# Patient Record
Sex: FEMALE | Race: BLACK OR AFRICAN AMERICAN | Employment: UNEMPLOYED
[De-identification: names, ages, dates, MRNs, and addresses within clinical notes are randomized per-mention and may not be internally consistent; named-entity substitution may affect disease eponyms.]

---

## 2017-02-16 ENCOUNTER — OFFICE VISIT (OUTPATIENT)
Dept: OBGYN | Facility: CLINIC | Age: 42
End: 2017-02-16

## 2017-02-16 VITALS
DIASTOLIC BLOOD PRESSURE: 80 MMHG | WEIGHT: 183 LBS | HEIGHT: 65 IN | SYSTOLIC BLOOD PRESSURE: 110 MMHG | BODY MASS INDEX: 30.49 KG/M2

## 2017-02-16 DIAGNOSIS — D25.9 UTERINE LEIOMYOMA, UNSPECIFIED LOCATION: ICD-10-CM

## 2017-02-16 DIAGNOSIS — N92.0 MENORRHAGIA WITH REGULAR CYCLE: Primary | ICD-10-CM

## 2017-02-16 DIAGNOSIS — N94.6 DYSMENORRHEA: ICD-10-CM

## 2017-02-16 PROCEDURE — G8484 FLU IMMUNIZE NO ADMIN: HCPCS | Performed by: OBSTETRICS & GYNECOLOGY

## 2017-02-16 PROCEDURE — G8417 CALC BMI ABV UP PARAM F/U: HCPCS | Performed by: OBSTETRICS & GYNECOLOGY

## 2017-02-16 PROCEDURE — 99213 OFFICE O/P EST LOW 20 MIN: CPT | Performed by: OBSTETRICS & GYNECOLOGY

## 2017-02-16 PROCEDURE — 1036F TOBACCO NON-USER: CPT | Performed by: OBSTETRICS & GYNECOLOGY

## 2017-02-16 PROCEDURE — G8427 DOCREV CUR MEDS BY ELIG CLIN: HCPCS | Performed by: OBSTETRICS & GYNECOLOGY

## 2017-10-19 ENCOUNTER — OFFICE VISIT (OUTPATIENT)
Dept: FAMILY MEDICINE CLINIC | Age: 42
End: 2017-10-19
Payer: COMMERCIAL

## 2017-10-19 VITALS
SYSTOLIC BLOOD PRESSURE: 108 MMHG | TEMPERATURE: 98.1 F | RESPIRATION RATE: 16 BRPM | HEIGHT: 64 IN | HEART RATE: 65 BPM | BODY MASS INDEX: 30.01 KG/M2 | DIASTOLIC BLOOD PRESSURE: 69 MMHG | WEIGHT: 175.8 LBS

## 2017-10-19 DIAGNOSIS — Z13.220 LIPID SCREENING: ICD-10-CM

## 2017-10-19 DIAGNOSIS — Z23 NEED FOR TETANUS BOOSTER: ICD-10-CM

## 2017-10-19 DIAGNOSIS — Z94.0 KIDNEY TRANSPLANT RECIPIENT: ICD-10-CM

## 2017-10-19 DIAGNOSIS — J45.20 MILD INTERMITTENT ASTHMA WITHOUT COMPLICATION: ICD-10-CM

## 2017-10-19 DIAGNOSIS — Z83.3 FAMILY HISTORY OF DIABETES MELLITUS (DM): ICD-10-CM

## 2017-10-19 DIAGNOSIS — Z23 NEED FOR VACCINATION WITH 13-POLYVALENT PNEUMOCOCCAL CONJUGATE VACCINE: ICD-10-CM

## 2017-10-19 DIAGNOSIS — I10 ESSENTIAL HYPERTENSION: Primary | ICD-10-CM

## 2017-10-19 DIAGNOSIS — Z11.4 SCREENING FOR HIV WITHOUT PRESENCE OF RISK FACTORS: ICD-10-CM

## 2017-10-19 DIAGNOSIS — Z23 NEED FOR INFLUENZA VACCINATION: ICD-10-CM

## 2017-10-19 PROCEDURE — G0009 ADMIN PNEUMOCOCCAL VACCINE: HCPCS | Performed by: INTERNAL MEDICINE

## 2017-10-19 PROCEDURE — 99204 OFFICE O/P NEW MOD 45 MIN: CPT | Performed by: INTERNAL MEDICINE

## 2017-10-19 PROCEDURE — G8419 CALC BMI OUT NRM PARAM NOF/U: HCPCS | Performed by: INTERNAL MEDICINE

## 2017-10-19 PROCEDURE — G0008 ADMIN INFLUENZA VIRUS VAC: HCPCS | Performed by: INTERNAL MEDICINE

## 2017-10-19 PROCEDURE — 90715 TDAP VACCINE 7 YRS/> IM: CPT | Performed by: INTERNAL MEDICINE

## 2017-10-19 PROCEDURE — 90686 IIV4 VACC NO PRSV 0.5 ML IM: CPT | Performed by: INTERNAL MEDICINE

## 2017-10-19 PROCEDURE — 90670 PCV13 VACCINE IM: CPT | Performed by: INTERNAL MEDICINE

## 2017-10-19 PROCEDURE — G8484 FLU IMMUNIZE NO ADMIN: HCPCS | Performed by: INTERNAL MEDICINE

## 2017-10-19 PROCEDURE — 1036F TOBACCO NON-USER: CPT | Performed by: INTERNAL MEDICINE

## 2017-10-19 PROCEDURE — G8427 DOCREV CUR MEDS BY ELIG CLIN: HCPCS | Performed by: INTERNAL MEDICINE

## 2017-10-19 RX ORDER — METOPROLOL TARTRATE AND HYDROCHLOROTHIAZIDE 50; 25 MG/1; MG/1
1 TABLET ORAL DAILY
Qty: 30 TABLET | Refills: 2 | Status: SHIPPED | OUTPATIENT
Start: 2017-10-19 | End: 2018-01-22 | Stop reason: SDUPTHER

## 2017-10-19 RX ORDER — CLONIDINE HYDROCHLORIDE 0.2 MG/1
0.2 TABLET ORAL 2 TIMES DAILY
Qty: 60 TABLET | Refills: 2 | Status: SHIPPED | OUTPATIENT
Start: 2017-10-19 | End: 2018-01-22 | Stop reason: SDUPTHER

## 2017-10-19 RX ORDER — ALBUTEROL SULFATE 90 UG/1
2 AEROSOL, METERED RESPIRATORY (INHALATION) EVERY 6 HOURS PRN
Qty: 1 INHALER | Refills: 2 | Status: SHIPPED | OUTPATIENT
Start: 2017-10-19 | End: 2018-01-25 | Stop reason: SDUPTHER

## 2017-10-19 ASSESSMENT — PATIENT HEALTH QUESTIONNAIRE - PHQ9
SUM OF ALL RESPONSES TO PHQ9 QUESTIONS 1 & 2: 0
1. LITTLE INTEREST OR PLEASURE IN DOING THINGS: 0
SUM OF ALL RESPONSES TO PHQ QUESTIONS 1-9: 0
2. FEELING DOWN, DEPRESSED OR HOPELESS: 0

## 2017-10-19 NOTE — PROGRESS NOTES
[facility] PHYSICIANS  [facility] AND [facility] REHABILITATION [address] [facility] [address] [place] [address]  Dept: [phone]  Dept Fax: [phone]    New Patient Visit Note  Date of patient's visit: 10/19/2017  Patient's Name:  Cloria Koyanagi YOB: 1975            No care team member to display  ______________________________________________________________________    Reason for visit: Establish care/Preventative care  ______________________________________________________________________  Chief Compliant   Establish Care and Hypertension      ______________________________________________________________________  History of Presenting Illness:  History was obtained from the patient. Cloria Koyanagi is a 39 y.o. is here to establish care. Last visit with PCP was 3 months ago. Patient left PCP because he retired. Patient is a transplant recipient and currently on Prograf. She follows up with her nephrologist.  She has hypertension for which she is on medication for this. Blood pressures controlled. She also has history of asthma which is controlled with albuterol inhaler only. She doesn't have any new complaints today. She is due for blood work. She is also due for immunizations.   ______________________________________________________________________  Past Medical/Surgical History:        Diagnosis Date    Asthma     HTN (hypertension)     Hx of kidney transplant            Procedure Laterality Date    DIALYSIS FISTULA CREATION Right     no bp right arm x 2    KIDNEY TRANSPLANT  2006    TUBAL LIGATION         ______________________________________________________________________  Health Maintenance Due   Topic Date Due    HIV screen  12/08/1990    DTaP/Tdap/Td vaccine (1 - Tdap) 12/08/1994    Pneumococcal highest risk (1 of 3 - PCV13) 12/08/1994    Lipid screen  12/08/2015    Diabetes screen  12/08/2015    Flu vaccine (1) 09/01/2017       Allergies   Allergen Reactions    Iodine Encounters:   10/19/17 108/69   02/16/17 110/80   11/01/16 140/84      General appearance - alert, well appearing, and in no distress  Mental status - alert, oriented to person, place, and time  Lymphatics - no palpable lymphadenopathy, no hepatosplenomegaly  Chest - clear to auscultation, no wheezes, rales or rhonchi, symmetric air entry  Heart - normal rate, regular rhythm, normal S1, S2, no murmurs, rubs, clicks or gallops  Abdomen - soft, nontender, nondistended, no masses or organomegaly  Neurological - alert, oriented, normal speech, no focal findings or movement disorder noted  Musculoskeletal - no joint tenderness, deformity or swelling  Extremities - peripheral pulses normal, no pedal edema, no clubbing or cyanosis    ______________________________________________________________________  Diagnostic findings:  CBC:No results found for: WBC, HGB, PLT    BMP:  No results found for: NA, K, CL, CO2, BUN, CREATININE, GLUCOSE    HEMOGLOBIN A1C: No results found for: LABA1C    FASTING LIPID PANEL:No results found for: CHOL, HDL, TRIG  ______________________________________________________________________  Assessment and Plan:  Maximino Favre was seen today for establish care and hypertension. Diagnoses and all orders for this visit:    Essential hypertension  -     Basic Metabolic Panel; Future  -     CBC Auto Differential; Future  -     metoprolol-hydrochlorothiazide (LOPRESSOR HCT) 50-25 MG per tablet; Take 1 tablet by mouth daily  -     cloNIDine (CATAPRES) 0.2 MG tablet;  Take 1 tablet by mouth 2 times daily    Need for influenza vaccination  -     INFLUENZA, QUADV, 3 YRS AND OLDER, IM, PF, PREFILL SYR OR SDV, 0.5ML (FLUZONE QUADV, PF)    Need for vaccination with 13-polyvalent pneumococcal conjugate vaccine  -     Pneumococcal conjugate vaccine 13-valent    Need for tetanus booster  -     Tdap (age 10y-63y) IM (Adacel)    Mild intermittent asthma without complication  -     albuterol sulfate HFA (PROAIR HFA) 108 (90 Base) MCG/ACT inhaler; Inhale 2 puffs into the lungs every 6 hours as needed for Wheezing    Kidney transplant recipient  -     Basic Metabolic Panel; Future    Lipid screening  -     Lipid Panel; Future    Family history of diabetes mellitus (DM)  -     Hemoglobin A1C; Future    Screening for HIV without presence of risk factors  -     HIV Screen; Future        ______________________________________________________________________  Follow up and instructions:  · Return in about 3 months (around 1/19/2018) for HTN. · Virl Officer received counseling on the following healthy behaviors: nutrition and exercise    · Discussed use, benefit, and side effects of prescribed medications. Barriers to medication compliance addressed. All patient questions answered. Pt voiced understanding. · Patient given educational materials - see patient instructions    Cherry Cordero MD, [place], [address] [place] [address] Internal Medicine Associate  10/19/2017, 10:50 AM    This note is created with the assistance of a speech-recognition program. While intending to generate a document that actually reflects the content of the visit, the document can still have some mistakes which may not have been identified and corrected by editing.

## 2017-10-19 NOTE — PROGRESS NOTES
Chronic Disease Visit Information    BP Readings from Last 3 Encounters:   02/16/17 110/80   11/01/16 140/84          No results found for: LABA1C, LABMICR, LDLCHOLESTEROL, LDLCALC, HDL, BUN, CREATININE, GLUCOSE         Have you changed or started any medications since your last visit including any over-the-counter medicines, vitamins, or herbal medicines? no   Are you having any side effects from any of your medications? -  no  Have you stopped taking any of your medications? Is so, why? -  no    Have you seen any other physician or provider since your last visit? No  Have you had any other diagnostic tests since your last visit? No  Have you been seen in the emergency room and/or had an admission to a hospital since we last saw you? No  Have you had your annual diabetic retinal (eye) exam? No  Have you had your routine dental cleaning in the past 6 months? no    Have you activated your [employer] account? If not, what are your barriers?  No:      No care team member to display         Medical History Review  Past Medical, Family, and Social History reviewed and does contribute to the patient presenting condition    Health Maintenance   Topic Date Due    HIV screen  12/08/1990    DTaP/Tdap/Td vaccine (1 - Tdap) 12/08/1994    Pneumococcal highest risk (1 of 3 - PCV13) 12/08/1994    Lipid screen  12/08/2015    Diabetes screen  12/08/2015    Flu vaccine (1) 09/01/2017    Cervical cancer screen  11/01/2019

## 2017-12-07 ENCOUNTER — NURSE ONLY (OUTPATIENT)
Dept: FAMILY MEDICINE CLINIC | Age: 42
End: 2017-12-07
Payer: COMMERCIAL

## 2017-12-07 VITALS
BODY MASS INDEX: 30 KG/M2 | DIASTOLIC BLOOD PRESSURE: 75 MMHG | SYSTOLIC BLOOD PRESSURE: 120 MMHG | TEMPERATURE: 97.9 F | HEART RATE: 60 BPM | HEIGHT: 64 IN | WEIGHT: 175.71 LBS | RESPIRATION RATE: 16 BRPM

## 2017-12-07 DIAGNOSIS — Z23 NEED FOR MMR VACCINE: Primary | ICD-10-CM

## 2017-12-07 DIAGNOSIS — Z00.00 NORMAL PHYSICAL EXAM: ICD-10-CM

## 2017-12-07 PROCEDURE — 90707 MMR VACCINE SC: CPT | Performed by: INTERNAL MEDICINE

## 2017-12-07 PROCEDURE — 90471 IMMUNIZATION ADMIN: CPT | Performed by: INTERNAL MEDICINE

## 2017-12-07 PROCEDURE — 99396 PREV VISIT EST AGE 40-64: CPT | Performed by: INTERNAL MEDICINE

## 2017-12-07 ASSESSMENT — ENCOUNTER SYMPTOMS
DOUBLE VISION: 0
BLURRED VISION: 0
HEARTBURN: 0
NAUSEA: 0
COUGH: 0
HEMOPTYSIS: 0
ABDOMINAL PAIN: 0

## 2017-12-07 NOTE — PROGRESS NOTES
[facility] PHYSICIANS  [facility] [place] [facility] AND Radha Meehan [address] [place] [address]  Dept: [phone]  Dept Fax: [phone]    Office Progress/Follow Up Note  Date of patient's visit: 12/7/2017  Patient's Name:  Thai Villafana YOB: 1975            Patient Care Team:  Cherry Curry MD as PCP - General (Internal Medicine)  ================================================================    REASON FOR VISIT/CHIEF COMPLAINT:  Immunizations (MMR) and Annual Exam    HISTORY OF PRESENTING ILLNESS:  History was obtained from: patientKimberly Villafana is a 39 y.o. is here for annual physical exam and immunization completion. Overall patient is doing well. She recently got a job and would like to have physical as well as immunization completed. She doesn't have any new complaints today. Blood pressure and asthma controlled. Patient is cyst kidney transplant recipient. She is due for blood work which was given to her at 3 months ago. Problem list, medications and blood work reviewed. Patient Active Problem List   Diagnosis    Essential hypertension    Mild intermittent asthma without complication    Kidney transplant recipient       Health Maintenance Due   Topic Date Due    HIV screen  12/08/1990    Lipid screen  12/08/2015    Diabetes screen  12/08/2015       Allergies   Allergen Reactions    Iodine Itching    Pcn [Penicillins] Itching         Current Outpatient Prescriptions   Medication Sig Dispense Refill    metoprolol-hydrochlorothiazide (LOPRESSOR HCT) 50-25 MG per tablet Take 1 tablet by mouth daily 30 tablet 2    cloNIDine (CATAPRES) 0.2 MG tablet Take 1 tablet by mouth 2 times daily 60 tablet 2    albuterol sulfate HFA (PROAIR HFA) 108 (90 Base) MCG/ACT inhaler Inhale 2 puffs into the lungs every 6 hours as needed for Wheezing 1 Inhaler 2    PROGRAF 1 MG capsule        No current facility-administered medications for this visit.         Social History Substance Use Topics    Smoking status: Never Smoker    Smokeless tobacco: Never Used    Alcohol use Yes      Comment: social       Family History   Problem Relation Age of Onset    Heart Disease Maternal Grandmother     Cancer Maternal Grandmother     Prostate Cancer Maternal Grandfather     Hypertension Father     Cancer Father      leukemia    Hypertension Mother         REVIEW OF SYSTEMS:  Review of Systems   Constitutional: Negative for chills and fever. HENT: Negative for congestion, ear discharge and nosebleeds. Eyes: Negative for blurred vision and double vision. Respiratory: Negative for cough and hemoptysis. Cardiovascular: Negative for chest pain and palpitations. Gastrointestinal: Negative for abdominal pain, heartburn and nausea. Genitourinary: Negative for dysuria and urgency. Musculoskeletal: Negative for myalgias and neck pain. Neurological: Negative for dizziness and headaches. Endo/Heme/Allergies: Does not bruise/bleed easily. Psychiatric/Behavioral: Negative for depression and suicidal ideas. PHYSICAL EXAM:  Vitals:    12/07/17 0937   BP: 120/75   Site: Left Arm   Position: Sitting   Cuff Size: Medium Adult   Pulse: 60   Resp: 16   Temp: 97.9 °F (36.6 °C)   TempSrc: Oral   Weight: 175 lb 11.3 oz (79.7 kg)   Height: 5' 4.49\" (1.638 m)     BP Readings from Last 3 Encounters:   12/07/17 120/75   10/19/17 108/69   02/16/17 110/80        Physical Exam   Constitutional: She is oriented to person, place, and time and well-developed, well-nourished, and in no distress. No distress. HENT:   Mouth/Throat: No oropharyngeal exudate. Neck: Normal range of motion. Neck supple. No thyromegaly present. Cardiovascular: Normal rate, regular rhythm, normal heart sounds and intact distal pulses. Pulmonary/Chest: Effort normal and breath sounds normal. No respiratory distress. She has no wheezes. Abdominal: Soft.  Bowel sounds are normal. She exhibits no distension and no mass. There is no tenderness. Musculoskeletal: Normal range of motion. She exhibits no edema or tenderness. Neurological: She is alert and oriented to person, place, and time. No cranial nerve deficit. Skin: Skin is warm. No rash noted. She is not diaphoretic. No erythema. Psychiatric: Mood, memory, affect and judgment normal.         DIAGNOSTIC FINDINGS:  CBC:No results found for: WBC, HGB, PLT    BMP:  No results found for: NA, K, CL, CO2, BUN, CREATININE, GLUCOSE    HEMOGLOBIN A1C: No results found for: LABA1C    FASTING LIPID PANEL:No results found for: CHOL, HDL, TRIG    ASSESSMENT AND PLAN:  Virl Officer was seen today for immunizations and annual exam.    Diagnoses and all orders for this visit:    Need for MMR vaccine. MMR vaccine subcutaneous    Normal physical exam.  No limitation on her new employment. FOLLOW UP AND INSTRUCTIONS:  No Follow-up on file. · Virl Officer received counseling on the following healthy behaviors: nutrition and exercise    · Discussed use, benefit, and side effects of prescribed medications. Barriers to medication compliance addressed. All patient questions answered. Pt voiced understanding. · Patient given educational materials - see patient instructions    Cherry Purcell MD, [place], [address] [place] [address] Internal Medicine Associate  12/7/2017, 10:00 AM    This note is created with the assistance of a speech-recognition program. While intending to generate a document that actually reflects the content of the visit, the document can still have some mistakes which may not have been identified and corrected by editing.

## 2018-01-22 DIAGNOSIS — I10 ESSENTIAL HYPERTENSION: ICD-10-CM

## 2018-01-22 RX ORDER — METOPROLOL TARTRATE AND HYDROCHLOROTHIAZIDE 50; 25 MG/1; MG/1
TABLET ORAL
Qty: 30 TABLET | Refills: 2 | Status: SHIPPED | OUTPATIENT
Start: 2018-01-22 | End: 2018-04-28 | Stop reason: SDUPTHER

## 2018-01-22 RX ORDER — CLONIDINE HYDROCHLORIDE 0.2 MG/1
TABLET ORAL
Qty: 60 TABLET | Refills: 2 | Status: SHIPPED | OUTPATIENT
Start: 2018-01-22 | End: 2018-04-28 | Stop reason: SDUPTHER

## 2018-01-25 ENCOUNTER — OFFICE VISIT (OUTPATIENT)
Dept: FAMILY MEDICINE CLINIC | Age: 43
End: 2018-01-25
Payer: COMMERCIAL

## 2018-01-25 VITALS
RESPIRATION RATE: 16 BRPM | BODY MASS INDEX: 30.19 KG/M2 | TEMPERATURE: 98 F | DIASTOLIC BLOOD PRESSURE: 70 MMHG | WEIGHT: 176.8 LBS | HEART RATE: 64 BPM | HEIGHT: 64 IN | SYSTOLIC BLOOD PRESSURE: 102 MMHG

## 2018-01-25 DIAGNOSIS — Z13.220 LIPID SCREENING: ICD-10-CM

## 2018-01-25 DIAGNOSIS — J45.20 MILD INTERMITTENT ASTHMA WITHOUT COMPLICATION: Primary | ICD-10-CM

## 2018-01-25 DIAGNOSIS — G89.29 CHRONIC BILATERAL LOW BACK PAIN WITHOUT SCIATICA: ICD-10-CM

## 2018-01-25 DIAGNOSIS — Z83.3 FAMILY HISTORY OF DIABETES MELLITUS (DM): ICD-10-CM

## 2018-01-25 DIAGNOSIS — I10 ESSENTIAL HYPERTENSION: ICD-10-CM

## 2018-01-25 DIAGNOSIS — M54.50 CHRONIC BILATERAL LOW BACK PAIN WITHOUT SCIATICA: ICD-10-CM

## 2018-01-25 LAB — HBA1C MFR BLD: 5.3 %

## 2018-01-25 PROCEDURE — G8427 DOCREV CUR MEDS BY ELIG CLIN: HCPCS | Performed by: INTERNAL MEDICINE

## 2018-01-25 PROCEDURE — 99214 OFFICE O/P EST MOD 30 MIN: CPT | Performed by: INTERNAL MEDICINE

## 2018-01-25 PROCEDURE — 1036F TOBACCO NON-USER: CPT | Performed by: INTERNAL MEDICINE

## 2018-01-25 PROCEDURE — G8419 CALC BMI OUT NRM PARAM NOF/U: HCPCS | Performed by: INTERNAL MEDICINE

## 2018-01-25 PROCEDURE — 83037 HB GLYCOSYLATED A1C HOME DEV: CPT | Performed by: INTERNAL MEDICINE

## 2018-01-25 PROCEDURE — G8484 FLU IMMUNIZE NO ADMIN: HCPCS | Performed by: INTERNAL MEDICINE

## 2018-01-25 RX ORDER — CEPHALEXIN 500 MG/1
CAPSULE ORAL
Refills: 0 | COMMUNITY
Start: 2018-01-16 | End: 2018-01-25

## 2018-01-25 RX ORDER — ACETAMINOPHEN 500 MG
500 TABLET ORAL 2 TIMES DAILY PRN
Qty: 60 TABLET | Refills: 2 | Status: SHIPPED | OUTPATIENT
Start: 2018-01-25 | End: 2018-05-22 | Stop reason: SDUPTHER

## 2018-01-25 RX ORDER — ALBUTEROL SULFATE 90 UG/1
2 AEROSOL, METERED RESPIRATORY (INHALATION) EVERY 6 HOURS PRN
Qty: 1 INHALER | Refills: 2 | Status: SHIPPED | OUTPATIENT
Start: 2018-01-25 | End: 2018-05-22 | Stop reason: SDUPTHER

## 2018-01-25 ASSESSMENT — ENCOUNTER SYMPTOMS
BLURRED VISION: 0
HEMOPTYSIS: 0
HEARTBURN: 0
DOUBLE VISION: 0
NAUSEA: 0
ABDOMINAL PAIN: 0
COUGH: 0
BACK PAIN: 1

## 2018-01-25 NOTE — PROGRESS NOTES
monitoring  1975    HIV screen  12/08/1990    Lipid screen  12/08/2015    Diabetes screen  12/08/2015    Pneumococcal highest risk (2 of 3 - PPSV23) 12/14/2017       Allergies   Allergen Reactions    Iodine Itching    Pcn [Penicillins] Itching         Current Outpatient Prescriptions   Medication Sig Dispense Refill    albuterol sulfate HFA (PROAIR HFA) 108 (90 Base) MCG/ACT inhaler Inhale 2 puffs into the lungs every 6 hours as needed for Wheezing 1 Inhaler 2    acetaminophen (TYLENOL) 500 MG tablet Take 1 tablet by mouth 2 times daily as needed for Pain 60 tablet 2    cloNIDine (CATAPRES) 0.2 MG tablet take 1 tablet by mouth twice a day 60 tablet 2    metoprolol-hydrochlorothiazide (LOPRESSOR HCT) 50-25 MG per tablet take 1 tablet by mouth once daily 30 tablet 2    PROGRAF 1 MG capsule        No current facility-administered medications for this visit. Social History   Substance Use Topics    Smoking status: Never Smoker    Smokeless tobacco: Never Used    Alcohol use Yes      Comment: social       Family History   Problem Relation Age of Onset    Heart Disease Maternal Grandmother     Cancer Maternal Grandmother     Prostate Cancer Maternal Grandfather     Hypertension Father     Cancer Father      leukemia    Hypertension Mother         REVIEW OF SYSTEMS:  Review of Systems   Constitutional: Negative for chills and fever. HENT: Negative for congestion, ear discharge and nosebleeds. Eyes: Negative for blurred vision and double vision. Respiratory: Negative for cough and hemoptysis. Cardiovascular: Negative for chest pain and palpitations. Gastrointestinal: Negative for abdominal pain, heartburn and nausea. Genitourinary: Negative for dysuria and urgency. Musculoskeletal: Positive for back pain. Negative for myalgias and neck pain. Neurological: Negative for dizziness and headaches. Endo/Heme/Allergies: Does not bruise/bleed easily.    Psychiatric/Behavioral: Negative for depression and suicidal ideas. PHYSICAL EXAM:  Vitals:    01/25/18 1101   BP: 102/70   Site: Left Arm   Position: Sitting   Cuff Size: Medium Adult   Pulse: 64   Resp: 16   Temp: 98 °F (36.7 °C)   TempSrc: Oral   Weight: 176 lb 12.8 oz (80.2 kg)   Height: 5' 4.49\" (1.638 m)     BP Readings from Last 3 Encounters:   01/25/18 102/70   12/07/17 120/75   10/19/17 108/69        Physical Exam   Constitutional: She is oriented to person, place, and time and well-developed, well-nourished, and in no distress. No distress. HENT:   Mouth/Throat: No oropharyngeal exudate. Neck: Normal range of motion. Neck supple. No thyromegaly present. Cardiovascular: Normal rate, regular rhythm, normal heart sounds and intact distal pulses. Pulmonary/Chest: Effort normal and breath sounds normal. No respiratory distress. She has no wheezes. Abdominal: Soft. Bowel sounds are normal. She exhibits no distension and no mass. There is no tenderness. Musculoskeletal: Normal range of motion. She exhibits no edema or tenderness. Neurological: She is alert and oriented to person, place, and time. No cranial nerve deficit. Skin: Skin is warm. No rash noted. She is not diaphoretic. No erythema. Psychiatric: Mood, memory, affect and judgment normal.         DIAGNOSTIC FINDINGS:  CBC:No results found for: WBC, HGB, PLT    BMP:  No results found for: NA, K, CL, CO2, BUN, CREATININE, GLUCOSE    HEMOGLOBIN A1C: No results found for: LABA1C    FASTING LIPID PANEL:No results found for: CHOL, HDL, TRIG    ASSESSMENT AND PLAN:  Rachelle Archuleta was seen today for 3 month follow-up, hypertension and back pain. Diagnoses and all orders for this visit:    Mild intermittent asthma without complication  -     albuterol sulfate HFA (PROAIR HFA) 108 (90 Base) MCG/ACT inhaler; Inhale 2 puffs into the lungs every 6 hours as needed for Wheezing    Lipid screening  -     Lipid Panel;  Future    Essential hypertension        -     Continue

## 2018-02-08 ENCOUNTER — OFFICE VISIT (OUTPATIENT)
Dept: FAMILY MEDICINE CLINIC | Age: 43
End: 2018-02-08
Payer: COMMERCIAL

## 2018-02-08 VITALS
WEIGHT: 175 LBS | DIASTOLIC BLOOD PRESSURE: 76 MMHG | TEMPERATURE: 98 F | SYSTOLIC BLOOD PRESSURE: 110 MMHG | BODY MASS INDEX: 29.88 KG/M2 | HEART RATE: 66 BPM | RESPIRATION RATE: 16 BRPM | HEIGHT: 64 IN

## 2018-02-08 DIAGNOSIS — E78.00 PURE HYPERCHOLESTEROLEMIA: ICD-10-CM

## 2018-02-08 DIAGNOSIS — M54.50 CHRONIC BILATERAL LOW BACK PAIN WITHOUT SCIATICA: Primary | ICD-10-CM

## 2018-02-08 DIAGNOSIS — G89.29 CHRONIC BILATERAL LOW BACK PAIN WITHOUT SCIATICA: Primary | ICD-10-CM

## 2018-02-08 PROCEDURE — G8427 DOCREV CUR MEDS BY ELIG CLIN: HCPCS | Performed by: INTERNAL MEDICINE

## 2018-02-08 PROCEDURE — G8419 CALC BMI OUT NRM PARAM NOF/U: HCPCS | Performed by: INTERNAL MEDICINE

## 2018-02-08 PROCEDURE — G8484 FLU IMMUNIZE NO ADMIN: HCPCS | Performed by: INTERNAL MEDICINE

## 2018-02-08 PROCEDURE — 1036F TOBACCO NON-USER: CPT | Performed by: INTERNAL MEDICINE

## 2018-02-08 PROCEDURE — 1111F DSCHRG MED/CURRENT MED MERGE: CPT | Performed by: INTERNAL MEDICINE

## 2018-02-08 PROCEDURE — 99213 OFFICE O/P EST LOW 20 MIN: CPT | Performed by: INTERNAL MEDICINE

## 2018-02-08 RX ORDER — CYCLOBENZAPRINE HCL 5 MG
5 TABLET ORAL 2 TIMES DAILY PRN
Qty: 60 TABLET | Refills: 2 | Status: SHIPPED | OUTPATIENT
Start: 2018-02-08 | End: 2018-03-10

## 2018-02-08 RX ORDER — ATORVASTATIN CALCIUM 20 MG/1
TABLET, FILM COATED ORAL
Refills: 0 | COMMUNITY
Start: 2018-01-30 | End: 2018-05-22 | Stop reason: SDUPTHER

## 2018-02-08 RX ORDER — CYCLOBENZAPRINE HCL 5 MG
TABLET ORAL
Refills: 0 | COMMUNITY
Start: 2018-01-30 | End: 2018-05-22 | Stop reason: SDUPTHER

## 2018-02-08 RX ORDER — ATORVASTATIN CALCIUM 20 MG/1
20 TABLET, FILM COATED ORAL DAILY
Qty: 30 TABLET | Refills: 3 | Status: SHIPPED | OUTPATIENT
Start: 2018-02-08 | End: 2018-05-22

## 2018-02-08 RX ORDER — PANTOPRAZOLE SODIUM 40 MG/1
TABLET, DELAYED RELEASE ORAL
Refills: 0 | COMMUNITY
Start: 2018-01-30 | End: 2018-05-22 | Stop reason: SDUPTHER

## 2018-02-08 RX ORDER — ACETAMINOPHEN AND CODEINE PHOSPHATE 300; 30 MG/1; MG/1
1 TABLET ORAL
Qty: 10 TABLET | Refills: 0 | Status: SHIPPED | OUTPATIENT
Start: 2018-02-08 | End: 2018-03-10

## 2018-02-08 NOTE — PROGRESS NOTES
Chronic Disease Visit Information    BP Readings from Last 3 Encounters:   01/25/18 102/70   12/07/17 120/75   10/19/17 108/69          Hemoglobin A1C (%)   Date Value   01/25/2018 5.3            Have you changed or started any medications since your last visit including any over-the-counter medicines, vitamins, or herbal medicines? no   Are you having any side effects from any of your medications? -  no  Have you stopped taking any of your medications? Is so, why? -  no    Have you seen any other physician or provider since your last visit? No  Have you had any other diagnostic tests since your last visit? No  Have you been seen in the emergency room and/or had an admission to a hospital since we last saw you? [facility] for back pain   Have you had your annual diabetic retinal (eye) exam? No  Have you had your routine dental cleaning in the past 6 months? no    Have you activated your [employer] account? If not, what are your barriers?  No:      Patient Care Team:  Cherry Conde MD as PCP - General (Internal Medicine)         Medical History Review  Past Medical, Family, and Social History reviewed and does contribute to the patient presenting condition    Health Maintenance   Topic Date Due    Potassium monitoring  1975    Creatinine monitoring  1975    HIV screen  12/08/1990    Lipid screen  12/08/2015    Pneumococcal highest risk (2 of 3 - PPSV23) 12/14/2017    Cervical cancer screen  11/01/2019    Diabetes screen  01/25/2021    DTaP/Tdap/Td vaccine (2 - Td) 10/19/2027    Flu vaccine  Completed

## 2018-04-28 DIAGNOSIS — I10 ESSENTIAL HYPERTENSION: ICD-10-CM

## 2018-05-01 RX ORDER — METOPROLOL TARTRATE AND HYDROCHLOROTHIAZIDE 50; 25 MG/1; MG/1
TABLET ORAL
Qty: 30 TABLET | Refills: 2 | Status: SHIPPED | OUTPATIENT
Start: 2018-05-01 | End: 2018-07-27 | Stop reason: SDUPTHER

## 2018-05-01 RX ORDER — CLONIDINE HYDROCHLORIDE 0.2 MG/1
TABLET ORAL
Qty: 60 TABLET | Refills: 2 | Status: SHIPPED | OUTPATIENT
Start: 2018-05-01 | End: 2018-07-27 | Stop reason: SDUPTHER

## 2018-05-22 ENCOUNTER — OFFICE VISIT (OUTPATIENT)
Dept: FAMILY MEDICINE CLINIC | Age: 43
End: 2018-05-22
Payer: COMMERCIAL

## 2018-05-22 VITALS
WEIGHT: 179.4 LBS | SYSTOLIC BLOOD PRESSURE: 130 MMHG | HEART RATE: 66 BPM | BODY MASS INDEX: 30.63 KG/M2 | HEIGHT: 64 IN | TEMPERATURE: 98.7 F | RESPIRATION RATE: 16 BRPM | DIASTOLIC BLOOD PRESSURE: 87 MMHG

## 2018-05-22 DIAGNOSIS — K21.9 GASTROESOPHAGEAL REFLUX DISEASE WITHOUT ESOPHAGITIS: ICD-10-CM

## 2018-05-22 DIAGNOSIS — E78.00 PURE HYPERCHOLESTEROLEMIA: ICD-10-CM

## 2018-05-22 DIAGNOSIS — M54.50 CHRONIC BILATERAL LOW BACK PAIN WITHOUT SCIATICA: Primary | ICD-10-CM

## 2018-05-22 DIAGNOSIS — J45.20 MILD INTERMITTENT ASTHMA WITHOUT COMPLICATION: ICD-10-CM

## 2018-05-22 DIAGNOSIS — I10 ESSENTIAL HYPERTENSION: ICD-10-CM

## 2018-05-22 DIAGNOSIS — G89.29 CHRONIC BILATERAL LOW BACK PAIN WITHOUT SCIATICA: Primary | ICD-10-CM

## 2018-05-22 DIAGNOSIS — Z13.220 LIPID SCREENING: ICD-10-CM

## 2018-05-22 PROCEDURE — 99214 OFFICE O/P EST MOD 30 MIN: CPT | Performed by: INTERNAL MEDICINE

## 2018-05-22 PROCEDURE — G8427 DOCREV CUR MEDS BY ELIG CLIN: HCPCS | Performed by: INTERNAL MEDICINE

## 2018-05-22 PROCEDURE — G8417 CALC BMI ABV UP PARAM F/U: HCPCS | Performed by: INTERNAL MEDICINE

## 2018-05-22 PROCEDURE — 1036F TOBACCO NON-USER: CPT | Performed by: INTERNAL MEDICINE

## 2018-05-22 RX ORDER — TRIAMCINOLONE ACETONIDE 1 MG/G
CREAM TOPICAL
Refills: 0 | COMMUNITY
Start: 2018-02-27

## 2018-05-22 RX ORDER — ACETAMINOPHEN 500 MG
500 TABLET ORAL 2 TIMES DAILY PRN
Qty: 60 TABLET | Refills: 2 | Status: SHIPPED | OUTPATIENT
Start: 2018-05-22

## 2018-05-22 RX ORDER — CETIRIZINE HYDROCHLORIDE 10 MG/1
10 TABLET ORAL DAILY
Qty: 30 TABLET | Refills: 2 | Status: SHIPPED | OUTPATIENT
Start: 2018-05-22

## 2018-05-22 RX ORDER — ATORVASTATIN CALCIUM 20 MG/1
20 TABLET, FILM COATED ORAL DAILY
Qty: 30 TABLET | Refills: 2 | Status: SHIPPED | OUTPATIENT
Start: 2018-05-22

## 2018-05-22 RX ORDER — PANTOPRAZOLE SODIUM 40 MG/1
40 TABLET, DELAYED RELEASE ORAL DAILY
Qty: 30 TABLET | Refills: 2 | Status: SHIPPED | OUTPATIENT
Start: 2018-05-22

## 2018-05-22 RX ORDER — CETIRIZINE HYDROCHLORIDE 10 MG/1
TABLET ORAL
Refills: 1 | COMMUNITY
Start: 2018-02-27 | End: 2018-05-22

## 2018-05-22 RX ORDER — KETOCONAZOLE 20 MG/ML
SHAMPOO TOPICAL
Refills: 0 | COMMUNITY
Start: 2018-02-27

## 2018-05-22 RX ORDER — ALBUTEROL SULFATE 90 UG/1
2 AEROSOL, METERED RESPIRATORY (INHALATION) EVERY 6 HOURS PRN
Qty: 1 INHALER | Refills: 2 | Status: SHIPPED | OUTPATIENT
Start: 2018-05-22

## 2018-05-22 RX ORDER — AMMONIUM LACTATE 12 G/100G
LOTION TOPICAL
Refills: 1 | COMMUNITY
Start: 2018-04-29 | End: 2018-05-22

## 2018-05-22 RX ORDER — CYCLOBENZAPRINE HCL 5 MG
5 TABLET ORAL 2 TIMES DAILY PRN
Qty: 60 TABLET | Refills: 2 | Status: SHIPPED | OUTPATIENT
Start: 2018-05-22

## 2018-05-22 RX ORDER — HYDROXYZINE HYDROCHLORIDE 25 MG/1
TABLET, FILM COATED ORAL
Refills: 1 | COMMUNITY
Start: 2018-04-29 | End: 2018-05-22

## 2018-05-22 ASSESSMENT — ENCOUNTER SYMPTOMS
BLURRED VISION: 0
ABDOMINAL PAIN: 0
COUGH: 0
HEMOPTYSIS: 0
DOUBLE VISION: 0
NAUSEA: 0
HEARTBURN: 0

## 2018-07-27 DIAGNOSIS — I10 ESSENTIAL HYPERTENSION: ICD-10-CM

## 2018-07-27 RX ORDER — CLONIDINE HYDROCHLORIDE 0.2 MG/1
TABLET ORAL
Qty: 60 TABLET | Refills: 2 | Status: SHIPPED | OUTPATIENT
Start: 2018-07-27

## 2018-07-27 RX ORDER — METOPROLOL TARTRATE AND HYDROCHLOROTHIAZIDE 50; 25 MG/1; MG/1
TABLET ORAL
Qty: 30 TABLET | Refills: 2 | Status: SHIPPED | OUTPATIENT
Start: 2018-07-27

## 2019-01-17 ENCOUNTER — HOSPITAL ENCOUNTER (OUTPATIENT)
Age: 44
Setting detail: SPECIMEN
Discharge: HOME | End: 2019-01-17
Payer: COMMERCIAL

## 2019-01-17 ENCOUNTER — OFFICE VISIT (OUTPATIENT)
Dept: OBGYN CLINIC | Age: 44
End: 2019-01-17
Payer: COMMERCIAL

## 2019-01-17 VITALS
BODY MASS INDEX: 29.16 KG/M2 | WEIGHT: 175 LBS | HEIGHT: 65 IN | SYSTOLIC BLOOD PRESSURE: 130 MMHG | DIASTOLIC BLOOD PRESSURE: 82 MMHG

## 2019-01-17 DIAGNOSIS — N83.201 RIGHT OVARIAN CYST: Primary | ICD-10-CM

## 2019-01-17 DIAGNOSIS — N89.8 VAGINAL DISCHARGE: ICD-10-CM

## 2019-01-17 DIAGNOSIS — D25.9 UTERINE LEIOMYOMA, UNSPECIFIED LOCATION: ICD-10-CM

## 2019-01-17 LAB
DIRECT EXAM: ABNORMAL
Lab: ABNORMAL
SPECIMEN DESCRIPTION: ABNORMAL
STATUS: ABNORMAL

## 2019-01-17 PROCEDURE — 1036F TOBACCO NON-USER: CPT | Performed by: OBSTETRICS & GYNECOLOGY

## 2019-01-17 PROCEDURE — G8427 DOCREV CUR MEDS BY ELIG CLIN: HCPCS | Performed by: OBSTETRICS & GYNECOLOGY

## 2019-01-17 PROCEDURE — 99213 OFFICE O/P EST LOW 20 MIN: CPT | Performed by: OBSTETRICS & GYNECOLOGY

## 2019-01-17 PROCEDURE — G8417 CALC BMI ABV UP PARAM F/U: HCPCS | Performed by: OBSTETRICS & GYNECOLOGY

## 2019-01-17 PROCEDURE — G8484 FLU IMMUNIZE NO ADMIN: HCPCS | Performed by: OBSTETRICS & GYNECOLOGY

## 2019-01-17 RX ORDER — CARVEDILOL 12.5 MG/1
TABLET ORAL
COMMUNITY

## 2019-01-17 RX ORDER — PANTOPRAZOLE SODIUM 40 MG/1
TABLET, DELAYED RELEASE ORAL
COMMUNITY

## 2019-01-17 RX ORDER — AMLODIPINE BESYLATE 10 MG/1
TABLET ORAL
Refills: 0 | COMMUNITY
Start: 2019-01-04

## 2019-01-17 RX ORDER — ATORVASTATIN CALCIUM 20 MG/1
TABLET, FILM COATED ORAL
COMMUNITY

## 2019-01-17 RX ORDER — AMLODIPINE BESYLATE 10 MG/1
TABLET ORAL
COMMUNITY

## 2019-01-17 RX ORDER — FUROSEMIDE 40 MG/1
TABLET ORAL
COMMUNITY

## 2019-01-17 RX ORDER — LANOLIN ALCOHOL/MO/W.PET/CERES
CREAM (GRAM) TOPICAL
Refills: 0 | COMMUNITY
Start: 2019-01-04

## 2019-01-17 RX ORDER — HYDROXYZINE HYDROCHLORIDE 25 MG/1
TABLET, FILM COATED ORAL
COMMUNITY

## 2019-01-17 ASSESSMENT — ENCOUNTER SYMPTOMS
GASTROINTESTINAL NEGATIVE: 1
RESPIRATORY NEGATIVE: 1
WHEEZING: 0
SHORTNESS OF BREATH: 0

## 2019-01-18 LAB
C TRACH DNA GENITAL QL NAA+PROBE: NEGATIVE
N. GONORRHOEAE DNA: NEGATIVE

## 2019-01-21 RX ORDER — METRONIDAZOLE 500 MG/1
500 TABLET ORAL 2 TIMES DAILY
Qty: 14 TABLET | Refills: 0 | Status: SHIPPED | OUTPATIENT
Start: 2019-01-21 | End: 2019-01-28

## 2019-01-28 ENCOUNTER — HOSPITAL ENCOUNTER (OUTPATIENT)
Dept: ULTRASOUND IMAGING | Age: 44
Discharge: HOME | End: 2019-01-30
Payer: COMMERCIAL

## 2019-01-28 DIAGNOSIS — D25.9 UTERINE LEIOMYOMA, UNSPECIFIED LOCATION: ICD-10-CM

## 2019-01-28 DIAGNOSIS — N83.201 RIGHT OVARIAN CYST: ICD-10-CM

## 2019-01-28 PROCEDURE — 76856 US EXAM PELVIC COMPLETE: CPT

## 2019-01-28 PROCEDURE — 76830 TRANSVAGINAL US NON-OB: CPT

## 2020-02-20 ENCOUNTER — OFFICE VISIT (OUTPATIENT)
Dept: OBGYN CLINIC | Age: 45
End: 2020-02-20
Payer: COMMERCIAL

## 2020-02-20 ENCOUNTER — HOSPITAL ENCOUNTER (OUTPATIENT)
Age: 45
Setting detail: SPECIMEN
Discharge: HOME | End: 2020-02-20
Payer: COMMERCIAL

## 2020-02-20 VITALS
HEIGHT: 65 IN | SYSTOLIC BLOOD PRESSURE: 124 MMHG | WEIGHT: 184 LBS | BODY MASS INDEX: 30.66 KG/M2 | DIASTOLIC BLOOD PRESSURE: 82 MMHG

## 2020-02-20 LAB
DIRECT EXAM: ABNORMAL
Lab: ABNORMAL
SPECIMEN DESCRIPTION: ABNORMAL

## 2020-02-20 PROCEDURE — G8484 FLU IMMUNIZE NO ADMIN: HCPCS | Performed by: OBSTETRICS & GYNECOLOGY

## 2020-02-20 PROCEDURE — 99396 PREV VISIT EST AGE 40-64: CPT | Performed by: OBSTETRICS & GYNECOLOGY

## 2020-02-20 ASSESSMENT — PATIENT HEALTH QUESTIONNAIRE - PHQ9
SUM OF ALL RESPONSES TO PHQ9 QUESTIONS 1 & 2: 0
SUM OF ALL RESPONSES TO PHQ QUESTIONS 1-9: 0
SUM OF ALL RESPONSES TO PHQ QUESTIONS 1-9: 0
2. FEELING DOWN, DEPRESSED OR HOPELESS: 0
1. LITTLE INTEREST OR PLEASURE IN DOING THINGS: 0

## 2020-02-20 NOTE — PROGRESS NOTES
History     Tobacco Use   Smoking Status Never Smoker   Smokeless Tobacco Never Used     Social History     Substance and Sexual Activity   Alcohol Use Yes    Comment: social     Current Outpatient Medications   Medication Sig Dispense Refill    amLODIPine (NORVASC) 10 MG tablet amlodipine 10 mg tablet   Take 1 tablet every day by oral route.  furosemide (LASIX) 40 MG tablet furosemide 40 mg tablet   take 1 tab daily      carvedilol (COREG) 12.5 MG tablet carvedilol 12.5 mg tablet   Take 3 tablets twice a day by oral route for 30 days.  amLODIPine (NORVASC) 10 MG tablet take 1 tablet by mouth once daily  0    magnesium oxide (MAG-OX) 400 (240 Mg) MG tablet take 1 tablet by mouth twice a day  0    hydrOXYzine (ATARAX) 25 MG tablet hydroxyzine HCl 25 mg tablet      atorvastatin (LIPITOR) 20 MG tablet atorvastatin 20 mg tablet      pantoprazole (PROTONIX) 40 MG tablet pantoprazole 40 mg tablet,delayed release   Take 1 tablet every day by oral route in the morning for 30 days.       cloNIDine (CATAPRES) 0.2 MG tablet take 1 tablet by mouth twice a day 60 tablet 2    metoprolol-hydrochlorothiazide (LOPRESSOR HCT) 50-25 MG per tablet take 1 tablet by mouth once daily 30 tablet 2    triamcinolone (KENALOG) 0.1 % cream APPLY A THIN LAYER TO THE AFFECTED AREAS TWICE DAILY  0    ketoconazole (NIZORAL) 2 % shampoo USE AS A WASH ON FACE ONCE DAILY  0    albuterol sulfate HFA (PROAIR HFA) 108 (90 Base) MCG/ACT inhaler Inhale 2 puffs into the lungs every 6 hours as needed for Wheezing 1 Inhaler 2    acetaminophen (TYLENOL) 500 MG tablet Take 1 tablet by mouth 2 times daily as needed for Pain 60 tablet 2    pantoprazole (PROTONIX) 40 MG tablet Take 1 tablet by mouth daily 30 tablet 2    atorvastatin (LIPITOR) 20 MG tablet Take 1 tablet by mouth daily 30 tablet 2    cyclobenzaprine (FLEXERIL) 5 MG tablet Take 1 tablet by mouth 2 times daily as needed for Muscle spasms 60 tablet 2    cetirizine (ZYRTEC) 10 MG tablet Take 1 tablet by mouth daily 30 tablet 2    PROGRAF 1 MG capsule        No current facility-administered medications for this visit. Allergies: Allergies   Allergen Reactions    Iodine Itching    Pcn [Penicillins] Itching       Gynecologic History:  Patient's last menstrual period was 2019 (lmp unknown). Sexually Active: Yes  STD History: No  Abnormal Pap History ? Birth Control: Yes, LTL    OB History    Para Term  AB Living   3 2 0 0 0 0   SAB TAB Ectopic Molar Multiple Live Births   0 0 0 0 0 0     ______________________________________________________________________  REVIEW OF SYSTEMS:        Constitutional:  Unexpected weight change no  Neurological:  Frequent headaches  no  Ophthalmic:  Recent visual changes no  ENT:   Difficulty swallowing  no  Breast:              Masses   no     Respiratory:  Shortness of breath  no    Cardiovascular: Chest pain   no     Gastrointestinal: Chronic diarrhea/constipation no   Urogenital:  Urinary incontinence  no                                         Heavy/irregular periods           no                                      Vaginal discharge                   no  Hematological: Bruises easy   no     Endocrine:  Nipple Discharge  no     Hot/Cold Intolerance  no   Psychological:  Mood and affect were wnl yes                                                                                                                                           Physical Exam:    Vitals:    20 1026   BP: 124/82   Site: Right Upper Arm   Position: Sitting   Cuff Size: Large Adult   Weight: 184 lb (83.5 kg)   Height: 5' 4.5\" (1.638 m)       General Appearance:  She does not appear to be in any distress. This  is a well developed, well nourished, well groomed female. Neurological:  The patient is alert and oriented to time, place, person, and situation without any noted sensory motor deficits.     Skin:  A brief inspection of the skin revealed

## 2020-02-20 NOTE — PATIENT INSTRUCTIONS
We will contact you with the results of your culture today and if any treatment is necessary we will forward that to your pharmacy. We will also contact you with the results of today's Pap smear and your mammogram.  Return to the office in 1 year or as needed. Have a wonderful year!

## 2020-02-21 LAB
C TRACH DNA GENITAL QL NAA+PROBE: NEGATIVE
N. GONORRHOEAE DNA: NEGATIVE
SPECIMEN DESCRIPTION: NORMAL

## 2020-02-25 RX ORDER — METRONIDAZOLE 500 MG/1
500 TABLET ORAL 2 TIMES DAILY
Qty: 14 TABLET | Refills: 0 | Status: SHIPPED | OUTPATIENT
Start: 2020-02-25 | End: 2020-10-12

## 2020-03-02 LAB — CYTOLOGY REPORT: NORMAL

## 2020-10-12 RX ORDER — METRONIDAZOLE 500 MG/1
TABLET ORAL
Qty: 14 TABLET | Refills: 0 | Status: SHIPPED | OUTPATIENT
Start: 2020-10-12 | End: 2022-04-13

## 2021-02-05 ENCOUNTER — HOSPITAL ENCOUNTER (EMERGENCY)
Age: 46
Discharge: LEFT BEFORE DISCHARGE | End: 2021-02-05
Attending: EMERGENCY MEDICINE
Payer: COMMERCIAL

## 2021-02-05 ENCOUNTER — APPOINTMENT (OUTPATIENT)
Dept: GENERAL RADIOLOGY | Age: 46
End: 2021-02-05
Payer: COMMERCIAL

## 2021-02-05 VITALS
SYSTOLIC BLOOD PRESSURE: 162 MMHG | RESPIRATION RATE: 16 BRPM | WEIGHT: 173 LBS | TEMPERATURE: 98.4 F | OXYGEN SATURATION: 94 % | HEIGHT: 64 IN | DIASTOLIC BLOOD PRESSURE: 106 MMHG | BODY MASS INDEX: 29.53 KG/M2 | HEART RATE: 87 BPM

## 2021-02-05 DIAGNOSIS — N17.9 AKI (ACUTE KIDNEY INJURY) (HCC): Primary | ICD-10-CM

## 2021-02-05 DIAGNOSIS — I50.21 ACUTE SYSTOLIC CONGESTIVE HEART FAILURE (HCC): ICD-10-CM

## 2021-02-05 DIAGNOSIS — Z94.0 KIDNEY TRANSPLANT RECIPIENT: ICD-10-CM

## 2021-02-05 LAB
ABSOLUTE EOS #: 0.36 K/UL (ref 0–0.44)
ABSOLUTE IMMATURE GRANULOCYTE: 0.05 K/UL (ref 0–0.3)
ABSOLUTE LYMPH #: 0.93 K/UL (ref 1.1–3.7)
ABSOLUTE MONO #: 0.67 K/UL (ref 0.1–1.2)
ALBUMIN SERPL-MCNC: 2.9 G/DL (ref 3.5–5.2)
ALBUMIN/GLOBULIN RATIO: ABNORMAL (ref 1–2.5)
ALP BLD-CCNC: 96 U/L (ref 35–104)
ALT SERPL-CCNC: 15 U/L (ref 5–33)
ANION GAP SERPL CALCULATED.3IONS-SCNC: 13 MMOL/L (ref 9–17)
AST SERPL-CCNC: 15 U/L
BASOPHILS # BLD: 0 % (ref 0–2)
BASOPHILS ABSOLUTE: <0.03 K/UL (ref 0–0.2)
BILIRUB SERPL-MCNC: 0.44 MG/DL (ref 0.3–1.2)
BNP INTERPRETATION: ABNORMAL
BUN BLDV-MCNC: 45 MG/DL (ref 6–20)
BUN/CREAT BLD: 9 (ref 9–20)
CALCIUM SERPL-MCNC: 8.3 MG/DL (ref 8.6–10.4)
CHLORIDE BLD-SCNC: 98 MMOL/L (ref 98–107)
CO2: 24 MMOL/L (ref 20–31)
CREAT SERPL-MCNC: 4.88 MG/DL (ref 0.5–0.9)
DIFFERENTIAL TYPE: ABNORMAL
EOSINOPHILS RELATIVE PERCENT: 3 % (ref 1–4)
GFR AFRICAN AMERICAN: 12 ML/MIN
GFR NON-AFRICAN AMERICAN: 10 ML/MIN
GFR SERPL CREATININE-BSD FRML MDRD: ABNORMAL ML/MIN/{1.73_M2}
GFR SERPL CREATININE-BSD FRML MDRD: ABNORMAL ML/MIN/{1.73_M2}
GLUCOSE BLD-MCNC: 131 MG/DL (ref 70–99)
HCT VFR BLD CALC: 25.4 % (ref 36.3–47.1)
HEMOGLOBIN: 7.9 G/DL (ref 11.9–15.1)
IMMATURE GRANULOCYTES: 0 %
LYMPHOCYTES # BLD: 8 % (ref 24–43)
MAGNESIUM: 1.8 MG/DL (ref 1.6–2.6)
MCH RBC QN AUTO: 30.4 PG (ref 25.2–33.5)
MCHC RBC AUTO-ENTMCNC: 31.1 G/DL (ref 28.4–34.8)
MCV RBC AUTO: 97.7 FL (ref 82.6–102.9)
MONOCYTES # BLD: 6 % (ref 3–12)
NRBC AUTOMATED: 0 PER 100 WBC
PDW BLD-RTO: 15.8 % (ref 11.8–14.4)
PLATELET # BLD: 192 K/UL (ref 138–453)
PLATELET ESTIMATE: ABNORMAL
PMV BLD AUTO: 11.8 FL (ref 8.1–13.5)
POTASSIUM SERPL-SCNC: 3.2 MMOL/L (ref 3.7–5.3)
PRO-BNP: ABNORMAL PG/ML
RBC # BLD: 2.6 M/UL (ref 3.95–5.11)
RBC # BLD: ABNORMAL 10*6/UL
SARS-COV-2, RAPID: NOT DETECTED
SARS-COV-2: NORMAL
SARS-COV-2: NORMAL
SEG NEUTROPHILS: 82 % (ref 36–65)
SEGMENTED NEUTROPHILS ABSOLUTE COUNT: 9.37 K/UL (ref 1.5–8.1)
SODIUM BLD-SCNC: 135 MMOL/L (ref 135–144)
SOURCE: NORMAL
TOTAL PROTEIN: 5.6 G/DL (ref 6.4–8.3)
WBC # BLD: 11.4 K/UL (ref 3.5–11.3)
WBC # BLD: ABNORMAL 10*3/UL

## 2021-02-05 PROCEDURE — 71045 X-RAY EXAM CHEST 1 VIEW: CPT

## 2021-02-05 PROCEDURE — 83880 ASSAY OF NATRIURETIC PEPTIDE: CPT

## 2021-02-05 PROCEDURE — 94761 N-INVAS EAR/PLS OXIMETRY MLT: CPT

## 2021-02-05 PROCEDURE — 85025 COMPLETE CBC W/AUTO DIFF WBC: CPT

## 2021-02-05 PROCEDURE — 80053 COMPREHEN METABOLIC PANEL: CPT

## 2021-02-05 PROCEDURE — 2700000000 HC OXYGEN THERAPY PER DAY

## 2021-02-05 PROCEDURE — 36415 COLL VENOUS BLD VENIPUNCTURE: CPT

## 2021-02-05 PROCEDURE — U0002 COVID-19 LAB TEST NON-CDC: HCPCS

## 2021-02-05 PROCEDURE — 94640 AIRWAY INHALATION TREATMENT: CPT

## 2021-02-05 PROCEDURE — 83735 ASSAY OF MAGNESIUM: CPT

## 2021-02-05 PROCEDURE — 99284 EMERGENCY DEPT VISIT MOD MDM: CPT

## 2021-02-05 RX ORDER — IPRATROPIUM BROMIDE AND ALBUTEROL SULFATE 2.5; .5 MG/3ML; MG/3ML
1 SOLUTION RESPIRATORY (INHALATION) ONCE
Status: COMPLETED | OUTPATIENT
Start: 2021-02-05 | End: 2021-02-05

## 2021-02-05 RX ADMIN — IPRATROPIUM BROMIDE AND ALBUTEROL SULFATE 1 AMPULE: 2.5; .5 SOLUTION RESPIRATORY (INHALATION) at 05:40

## 2021-02-05 NOTE — ED PROVIDER NOTES
ALBUTEROL SULFATE HFA (PROAIR HFA) 108 (90 BASE) MCG/ACT INHALER    Inhale 2 puffs into the lungs every 6 hours as needed for Wheezing    AMLODIPINE (NORVASC) 10 MG TABLET    amlodipine 10 mg tablet   Take 1 tablet every day by oral route. AMLODIPINE (NORVASC) 10 MG TABLET    take 1 tablet by mouth once daily    ATORVASTATIN (LIPITOR) 20 MG TABLET    Take 1 tablet by mouth daily    ATORVASTATIN (LIPITOR) 20 MG TABLET    atorvastatin 20 mg tablet    CARVEDILOL (COREG) 12.5 MG TABLET    carvedilol 12.5 mg tablet   Take 3 tablets twice a day by oral route for 30 days. CETIRIZINE (ZYRTEC) 10 MG TABLET    Take 1 tablet by mouth daily    CLONIDINE (CATAPRES) 0.2 MG TABLET    take 1 tablet by mouth twice a day    CYCLOBENZAPRINE (FLEXERIL) 5 MG TABLET    Take 1 tablet by mouth 2 times daily as needed for Muscle spasms    FUROSEMIDE (LASIX) 40 MG TABLET    furosemide 40 mg tablet   take 1 tab daily    HYDROXYZINE (ATARAX) 25 MG TABLET    hydroxyzine HCl 25 mg tablet    KETOCONAZOLE (NIZORAL) 2 % SHAMPOO    USE AS A WASH ON FACE ONCE DAILY    MAGNESIUM OXIDE (MAG-OX) 400 (240 MG) MG TABLET    take 1 tablet by mouth twice a day    METOPROLOL-HYDROCHLOROTHIAZIDE (LOPRESSOR HCT) 50-25 MG PER TABLET    take 1 tablet by mouth once daily    METRONIDAZOLE (FLAGYL) 500 MG TABLET    take 1 tablet by mouth twice a day for 7 days    PANTOPRAZOLE (PROTONIX) 40 MG TABLET    Take 1 tablet by mouth daily    PANTOPRAZOLE (PROTONIX) 40 MG TABLET    pantoprazole 40 mg tablet,delayed release   Take 1 tablet every day by oral route in the morning for 30 days. PROGRAF 1 MG CAPSULE        TRIAMCINOLONE (KENALOG) 0.1 % CREAM    APPLY A THIN LAYER TO THE AFFECTED AREAS TWICE DAILY     ALLERGIES     is allergic to iodine and pcn [penicillins].   FAMILY HISTORY She indicated that the status of her mother is unknown. She indicated that the status of her father is unknown. She indicated that the status of her maternal grandmother is unknown. She indicated that the status of her maternal grandfather is unknown. SOCIAL HISTORY       Social History     Tobacco Use    Smoking status: Never Smoker    Smokeless tobacco: Never Used   Substance Use Topics    Alcohol use: Yes     Comment: social    Drug use: Not on file     PHYSICAL EXAM     INITIAL VITALS: BP (!) 169/113   Pulse 83   Temp 98.3 °F (36.8 °C) (Oral)   Resp 21   Ht 5' 4\" (1.626 m)   Wt 173 lb (78.5 kg)   LMP  (LMP Unknown)   SpO2 94%   BMI 29.70 kg/m²    Physical Exam  HENT:      Head: Normocephalic. Right Ear: External ear normal.      Left Ear: External ear normal.      Nose: Nose normal.   Eyes:      Conjunctiva/sclera: Conjunctivae normal.   Cardiovascular:      Rate and Rhythm: Normal rate. Pulmonary:      Effort: Pulmonary effort is normal.      Breath sounds: Rales present. Abdominal:      General: Abdomen is flat. Musculoskeletal:      Right lower leg: Edema present. Left lower leg: Edema present. Skin:     General: Skin is dry. Neurological:      Mental Status: She is alert. Mental status is at baseline. Psychiatric:         Mood and Affect: Mood normal.         Behavior: Behavior normal.      Comments:     Limited exam secondary to Covid-19 pandemic. MEDICAL DECISION MAKING:   The patient is hemodynamically stable, afebrile, nontoxic-appearing. Physical exam notable for rales bilaterally and significant lower extremity edema. Based on history and exam concerning for CHF exacerbation and worsening renal function. ED plan for basic labs, chest x-ray, reassess.          DIAGNOSTIC RESULTS   EKG:All EKG's are interpreted by the Emergency Department Physician who either signs or Co-signs this chart in the absence of a cardiologist. RADIOLOGY:All plain film, CT, MRI, and formal ultrasound images (except ED bedside ultrasound) are read by the radiologist, see reports below, unless otherwisenoted in MDM or here. XR CHEST PORTABLE    (Results Pending)     LABS: All lab results were reviewed by myself, and all abnormals are listed below. Labs Reviewed   COMPREHENSIVE METABOLIC PANEL W/ REFLEX TO MG FOR LOW K - Abnormal; Notable for the following components:       Result Value    Glucose 131 (*)     BUN 45 (*)     CREATININE 4.88 (*)     Calcium 8.3 (*)     Potassium 3.2 (*)     Total Protein 5.6 (*)     Albumin 2.9 (*)     GFR Non- 10 (*)     GFR  12 (*)     All other components within normal limits   BRAIN NATRIURETIC PEPTIDE - Abnormal; Notable for the following components:    Pro-BNP 16,204 (*)     All other components within normal limits   MAGNESIUM   CBC WITH AUTO DIFFERENTIAL       EMERGENCY DEPARTMENTCOURSE:   Patient remained stable in the ED. Labs remarkable for:  Potassium 3.2  Creatinine 4.88  BNP 16,204  Chest x-ray prelim shows pulmonary congestion bilaterally. Patient will need admission for diuresis and renal allograft biopsy. Plan to transfer to [facility].    03:20 - Spoke with Dr. Ghulam Martinez, allergist [facility], accepts patient for admission to hospital.  Bed will be available shortly. [facility] will send ambulance for transfer their facility. Vitals:    Vitals:    02/05/21 0051 02/05/21 0052   BP: (!) 169/113    Pulse: 83    Resp: 21    Temp: 98.3 °F (36.8 °C)    TempSrc: Oral    SpO2: 94%    Weight:  173 lb (78.5 kg)   Height:  5' 4\" (1.626 m)       The patient was given the following medications while in the emergency department:  No orders of the defined types were placed in this encounter. CONSULTS:  None    FINAL IMPRESSION    No diagnosis found. DISPOSITION/PLAN   DISPOSITION        PATIENT REFERRED TO:  No follow-up provider specified.   DISCHARGE MEDICATIONS:  New Prescriptions No medications on file     Lupe Brooke MD  Attending Emergency Physician                   Ector Soto MD  02/05/21 2530

## 2021-02-05 NOTE — ED NOTES
Pt request to leave and take self to [facility]. Dr Scottie Howe notified.       Maria Victoria Castrejon RN  02/05/21 7727

## 2021-02-05 NOTE — ED NOTES
[facility] called @ 0068 - informed that cases are discharge dependent. Unable to provide time for possible bed.      Jennifer Gerardo  02/05/21 0684

## 2021-02-05 NOTE — ED NOTES
Report given to [facility], [address] [place] [address], RN  02/05/21 6940

## 2021-02-05 NOTE — ED NOTES
[facility] called @ [phone] in regards to updated plan for bed placement. Still no time frame for bed, as they are holding in their ED.      Jacky Encarnacion  02/05/21 3078

## 2021-02-05 NOTE — ED NOTES
Pt states she needs a breathing treatment d/t her asthma. Pt placed on pulse ox with reading of 86%, pt placed on 2L O2 NC, pt O2 reading 96% at this time. Pt on continuous pulse ox.   Dr. Marcela Green notified     Eliu Choi RN  02/05/21 8605       Eliu Choi RN  02/05/21 9323

## 2021-02-05 NOTE — ED NOTES
Previous note made in error. Patient leaving ED to go to [facility] ED - to wait for placement there. [facility] admissions aware, spoke with Cyndee.       Amos Smith  02/05/21 5959

## 2021-02-05 NOTE — ED NOTES
Pt being transferred to [facility]. Pt's kidney transplant was done at [facility] in 2006.      Jeannette Arellano RN  02/05/21 4288 [facility] RN  02/05/21 8117

## 2021-02-05 NOTE — ED PROVIDER NOTES
EMERGENCY DEPARTMENT ENCOUNTER   ATTENDING ATTESTATION     Pt Name: Elda Mcclure  MRN: [id number]  [place] 1975  Date of evaluation: 2/5/21   Elda Mcclure is a 39 y.o. female with CC: Shortness of Breath (pt seen at [facility] for rectal bleeding, discharged 1/27. put on \"water pills for swelling\". Pt states the water pills are making her swelling worse. )    MDM:   Patient is a 42-year-old female with history of kidney transplant that was seen overnight with fluid overload and due to be transferred to [facility] for kidney evaluation. 12:40 PM EST  Patient is still awaiting a bed over at outside hospital.  She states she would rather wait for a bed in their ER. Repeat vitals are stable. She is in no distress. She is aware of the risks of leaving she is alert and oriented with decision-making capacity. She will sign out ama and plans to drive herself over to [address]:       EKG: All EKG's are interpreted by the Emergency Department Physician who either signs or Co-signs this chart in the absence of a cardiologist.      RADIOLOGY:All plain film, CT, MRI, and formal ultrasound images (except ED bedside ultrasound) are read by the radiologist, see reports below, unless otherwise noted in MDM or here. XR CHEST PORTABLE   Final Result   Bilateral effusions with a right lower lung opacity that may represent   pneumonia. Follow-up chest radiograph is recommended to ensure resolution. LABS: All lab results were reviewed by myself, and all abnormals are listed below.   Labs Reviewed   CBC WITH AUTO DIFFERENTIAL - Abnormal; Notable for the following components:       Result Value    WBC 11.4 (*)     RBC 2.60 (*)     Hemoglobin 7.9 (*)     Hematocrit 25.4 (*)     RDW 15.8 (*)     Seg Neutrophils 82 (*)     Lymphocytes 8 (*)     Segs Absolute 9.37 (*)     Absolute Lymph # 0.93 (*)     All other components within normal limits COMPREHENSIVE METABOLIC PANEL W/ REFLEX TO MG FOR LOW K - Abnormal; Notable for the following components:    Glucose 131 (*)     BUN 45 (*)     CREATININE 4.88 (*)     Calcium 8.3 (*)     Potassium 3.2 (*)     Total Protein 5.6 (*)     Albumin 2.9 (*)     GFR Non- 10 (*)     GFR  12 (*)     All other components within normal limits   BRAIN NATRIURETIC PEPTIDE - Abnormal; Notable for the following components:    Pro-BNP 16,204 (*)     All other components within normal limits   MAGNESIUM   COVID-19     CONSULTS:  None  FINAL IMPRESSION      1. TANI (acute kidney injury) ([facility]    2. Acute systolic congestive heart failure ([facility]    3. Kidney transplant recipient            PASTMEDICAL HISTORY     Past Medical History:   Diagnosis Date    Asthma     Chronic bilateral low back pain without sciatica 1/25/2018    Gastroesophageal reflux disease without esophagitis 5/22/2018    HTN (hypertension)     Hx of kidney transplant      SURGICAL HISTORY       Past Surgical History:   Procedure Laterality Date    DIALYSIS FISTULA CREATION Right     no bp right arm x 2    KIDNEY TRANSPLANT  2006    TUBAL LIGATION       CURRENT MEDICATIONS       Previous Medications    ACETAMINOPHEN (TYLENOL) 500 MG TABLET    Take 1 tablet by mouth 2 times daily as needed for Pain    ALBUTEROL SULFATE HFA (PROAIR HFA) 108 (90 BASE) MCG/ACT INHALER    Inhale 2 puffs into the lungs every 6 hours as needed for Wheezing    AMLODIPINE (NORVASC) 10 MG TABLET    amlodipine 10 mg tablet   Take 1 tablet every day by oral route. AMLODIPINE (NORVASC) 10 MG TABLET    take 1 tablet by mouth once daily    ATORVASTATIN (LIPITOR) 20 MG TABLET    Take 1 tablet by mouth daily    ATORVASTATIN (LIPITOR) 20 MG TABLET    atorvastatin 20 mg tablet    CARVEDILOL (COREG) 12.5 MG TABLET    carvedilol 12.5 mg tablet   Take 3 tablets twice a day by oral route for 30 days. CETIRIZINE (ZYRTEC) 10 MG TABLET    Take 1 tablet by mouth daily    CLONIDINE (CATAPRES) 0.2 MG TABLET    take 1 tablet by mouth twice a day    CYCLOBENZAPRINE (FLEXERIL) 5 MG TABLET    Take 1 tablet by mouth 2 times daily as needed for Muscle spasms    FUROSEMIDE (LASIX) 40 MG TABLET    furosemide 40 mg tablet   take 1 tab daily    HYDROXYZINE (ATARAX) 25 MG TABLET    hydroxyzine HCl 25 mg tablet    KETOCONAZOLE (NIZORAL) 2 % SHAMPOO    USE AS A WASH ON FACE ONCE DAILY    MAGNESIUM OXIDE (MAG-OX) 400 (240 MG) MG TABLET    take 1 tablet by mouth twice a day    METOPROLOL-HYDROCHLOROTHIAZIDE (LOPRESSOR HCT) 50-25 MG PER TABLET    take 1 tablet by mouth once daily    METRONIDAZOLE (FLAGYL) 500 MG TABLET    take 1 tablet by mouth twice a day for 7 days    PANTOPRAZOLE (PROTONIX) 40 MG TABLET    Take 1 tablet by mouth daily    PANTOPRAZOLE (PROTONIX) 40 MG TABLET    pantoprazole 40 mg tablet,delayed release   Take 1 tablet every day by oral route in the morning for 30 days. PROGRAF 1 MG CAPSULE        TRIAMCINOLONE (KENALOG) 0.1 % CREAM    APPLY A THIN LAYER TO THE AFFECTED AREAS TWICE DAILY     ALLERGIES     is allergic to iodine and pcn [penicillins]. FAMILY HISTORY     She indicated that the status of her mother is unknown. She indicated that the status of her father is unknown. She indicated that the status of her maternal grandmother is unknown. She indicated that the status of her maternal grandfather is unknown. SOCIAL HISTORY       Social History     Tobacco Use    Smoking status: Never Smoker    Smokeless tobacco: Never Used   Substance Use Topics    Alcohol use: Yes     Comment: social    Drug use: Not on file       I personally evaluated and examined the patient in conjunction with the APC and agree with the assessment, treatment plan, and disposition of the patient as recorded by the APC.    Christiana Musa MD  Attending Emergency Physician         Christiana Musa MD  02/05/21 2997

## 2022-04-04 NOTE — PROGRESS NOTES
DATE OF VISIT:  22        History and Physical    Rand Clarke    :  1975  CHIEF COMPLAINT:    Chief Complaint   Patient presents with    Annual Exam     Here for annual Last pap 20 neg Last mammmogram 17 and left 17 neg                     HPI :   Crissy Núñez is a 55 y.o. femaleGRAVIDA 3 PARA     Ayaka Cool returns today for her annual exam.  She has a history of multiple intramural uterine fibroids and CRF. She is now on dialysis 3 times a week and back on the renal transplant list at [facility]. She is only had one bleeding episode since last year. She is having regular bowel movements without constipation or diarrhea. She denies any involuntary loss of urine. She is otherwise without any significant complaints today.   Oscar Bond is back on unemployment and has received her Covid vacs x2 and is due for booster.    _____________________________________________________________________  Past Medical History:   Diagnosis Date    Asthma     Chronic bilateral low back pain without sciatica 2018    Gastroesophageal reflux disease without esophagitis 2018    HTN (hypertension)     Hx of kidney transplant                                                                    Past Surgical History:   Procedure Laterality Date    DIALYSIS FISTULA CREATION Right     no bp right arm x 2    KIDNEY TRANSPLANT  2006    TUBAL LIGATION       Family History   Problem Relation Age of Onset    Heart Disease Maternal Grandmother     Cancer Maternal Grandmother     Prostate Cancer Maternal Grandfather     Hypertension Father     Cancer Father         leukemia    Hypertension Mother      Social History     Tobacco Use   Smoking Status Never Smoker   Smokeless Tobacco Never Used     Social History     Substance and Sexual Activity   Alcohol Use Yes    Comment: social     Current Outpatient Medications   Medication Sig Dispense Refill    metroNIDAZOLE (FLAGYL) 500 MG tablet take 1 tablet by mouth twice a day for 7 days 14 tablet 0    amLODIPine (NORVASC) 10 MG tablet amlodipine 10 mg tablet   Take 1 tablet every day by oral route.  furosemide (LASIX) 40 MG tablet furosemide 40 mg tablet   take 1 tab daily      carvedilol (COREG) 12.5 MG tablet carvedilol 12.5 mg tablet   Take 3 tablets twice a day by oral route for 30 days.  amLODIPine (NORVASC) 10 MG tablet take 1 tablet by mouth once daily  0    magnesium oxide (MAG-OX) 400 (240 Mg) MG tablet take 1 tablet by mouth twice a day  0    hydrOXYzine (ATARAX) 25 MG tablet hydroxyzine HCl 25 mg tablet      atorvastatin (LIPITOR) 20 MG tablet atorvastatin 20 mg tablet      pantoprazole (PROTONIX) 40 MG tablet pantoprazole 40 mg tablet,delayed release   Take 1 tablet every day by oral route in the morning for 30 days.  cloNIDine (CATAPRES) 0.2 MG tablet take 1 tablet by mouth twice a day 60 tablet 2    metoprolol-hydrochlorothiazide (LOPRESSOR HCT) 50-25 MG per tablet take 1 tablet by mouth once daily 30 tablet 2    triamcinolone (KENALOG) 0.1 % cream APPLY A THIN LAYER TO THE AFFECTED AREAS TWICE DAILY  0    ketoconazole (NIZORAL) 2 % shampoo USE AS A WASH ON FACE ONCE DAILY  0    albuterol sulfate HFA (PROAIR HFA) 108 (90 Base) MCG/ACT inhaler Inhale 2 puffs into the lungs every 6 hours as needed for Wheezing 1 Inhaler 2    acetaminophen (TYLENOL) 500 MG tablet Take 1 tablet by mouth 2 times daily as needed for Pain 60 tablet 2    pantoprazole (PROTONIX) 40 MG tablet Take 1 tablet by mouth daily 30 tablet 2    atorvastatin (LIPITOR) 20 MG tablet Take 1 tablet by mouth daily 30 tablet 2    cyclobenzaprine (FLEXERIL) 5 MG tablet Take 1 tablet by mouth 2 times daily as needed for Muscle spasms 60 tablet 2    cetirizine (ZYRTEC) 10 MG tablet Take 1 tablet by mouth daily 30 tablet 2    PROGRAF 1 MG capsule        No current facility-administered medications for this visit. Allergies:   Allergies   Allergen Reactions    Iodine Itching    Pcn [Penicillins] Itching       Gynecologic History:  Patient's last menstrual period was 2022. Sexually Active: Yes  STD History: No  Abnormal Pap History no  Birth Control: Yes, LTL    OB History    Para Term  AB Living   3 2 0 0 0 0   SAB IAB Ectopic Molar Multiple Live Births   0 0 0 0 0 0     ______________________________________________________________________  REVIEW OF SYSTEMS:        Constitutional:  Unexpected weight change no  Neurological:  Frequent headaches  no  Ophthalmic:  Recent visual changes no  ENT:   Difficulty swallowing  no  Breast:              Masses   no     Respiratory:  Shortness of breath  no    Cardiovascular: Chest pain   no     Gastrointestinal: Chronic diarrhea/constipation no   Urogenital:  Urinary incontinence  no                                         Heavy/irregular periods           no                                      Vaginal discharge                   no  Hematological: Bruises easy   no     Endocrine:  Nipple Discharge  no     Hot/Cold Intolerance  no   Psychological:  Mood and affect were wnl yes                                                                                                                                           Physical Exam:    Vitals:    22 1415   BP: 128/82   Site: Right Upper Arm   Position: Sitting   Cuff Size: Medium Adult   Weight: 143 lb (64.9 kg)   Height: 5' 4.5\" (1.638 m)       General Appearance:  She does not appear to be in any distress. This  is a well developed, well nourished, well groomed female. Neurological:  The patient is alert and oriented to time, place, person, and situation without any noted sensory motor deficits. Skin:  A brief inspection of the skin revealed no rashes or lesions. Neck:  The neck was supple. There is no tracheal deviation, thyromegaly or supraclavicular adenopathy appreciated.     Breast:   The patients breasts were symmetrical.  Breasts are nontender and there  were no masses, discharge or pathologic skin changes. There is no supraclavicular or axillary adenopathy bilaterally. Respiratory: There was unlabored respiratory effort. Lungs clear to ascultation without wheezes, rales or rhonchi in all fields bilaterally. Cardiovascular:  Normal sinus rhythm with a regular rate and without murmur, rubs or gallops. Abdomen: The abdomen was soft and non-tender with no guarding, rebound, CVAT or rigidity. No hernias were appreciated. Bowel sounds were normally active. Pelvic exam:  No vulvar, vaginal or cervical lesions are noted. Normal vaginal discharge present, no significant cystocele, rectocele or enterocele noted. Uterus nongravid and without CMT and adnexa nontender and without abnormal masses bilaterally. Extremities:  FROM and nontender without clubbing cyanosis or edema. ASSESSMENT:         ICD-10-CM    1. Encounter for gynecological examination without abnormal finding  Z01.419    2. Visit for screening mammogram  Z12.31                    PLAN:    Return to the office in 1 year or when necessary    Trina Lozano M.D., Dilia Ruff. [facility]  [place]

## 2022-04-05 ENCOUNTER — HOSPITAL ENCOUNTER (OUTPATIENT)
Age: 47
Setting detail: SPECIMEN
Discharge: HOME | End: 2022-04-05

## 2022-04-05 ENCOUNTER — OFFICE VISIT (OUTPATIENT)
Dept: OBGYN CLINIC | Age: 47
End: 2022-04-05
Payer: COMMERCIAL

## 2022-04-05 VITALS
SYSTOLIC BLOOD PRESSURE: 128 MMHG | HEIGHT: 65 IN | DIASTOLIC BLOOD PRESSURE: 82 MMHG | WEIGHT: 143 LBS | BODY MASS INDEX: 23.82 KG/M2

## 2022-04-05 DIAGNOSIS — Z12.31 VISIT FOR SCREENING MAMMOGRAM: ICD-10-CM

## 2022-04-05 DIAGNOSIS — Z01.419 ENCOUNTER FOR GYNECOLOGICAL EXAMINATION WITHOUT ABNORMAL FINDING: Primary | ICD-10-CM

## 2022-04-05 PROCEDURE — 1036F TOBACCO NON-USER: CPT | Performed by: OBSTETRICS & GYNECOLOGY

## 2022-04-05 PROCEDURE — G8427 DOCREV CUR MEDS BY ELIG CLIN: HCPCS | Performed by: OBSTETRICS & GYNECOLOGY

## 2022-04-05 PROCEDURE — G8420 CALC BMI NORM PARAMETERS: HCPCS | Performed by: OBSTETRICS & GYNECOLOGY

## 2022-04-05 PROCEDURE — G0101 CA SCREEN;PELVIC/BREAST EXAM: HCPCS | Performed by: OBSTETRICS & GYNECOLOGY

## 2022-04-05 ASSESSMENT — PATIENT HEALTH QUESTIONNAIRE - PHQ9
SUM OF ALL RESPONSES TO PHQ QUESTIONS 1-9: 2
SUM OF ALL RESPONSES TO PHQ QUESTIONS 1-9: 2
SUM OF ALL RESPONSES TO PHQ9 QUESTIONS 1 & 2: 2
1. LITTLE INTEREST OR PLEASURE IN DOING THINGS: 1
2. FEELING DOWN, DEPRESSED OR HOPELESS: 1
SUM OF ALL RESPONSES TO PHQ QUESTIONS 1-9: 2
SUM OF ALL RESPONSES TO PHQ QUESTIONS 1-9: 2

## 2022-04-05 NOTE — PATIENT INSTRUCTIONS
Please get your mammogram done as scheduled. We will contact you with the results of today's Pap smear as well as your mammogram.  Plan on returning to the office in 1 year for your annual or as needed. Good luck and have a safe and healthy 2022!

## 2022-04-13 LAB — CYTOLOGY REPORT: NORMAL

## 2022-04-13 RX ORDER — METRONIDAZOLE 500 MG/1
500 TABLET ORAL ONCE
Qty: 4 TABLET | Refills: 0 | Status: SHIPPED | OUTPATIENT
Start: 2022-04-13 | End: 2022-04-13

## 2022-04-19 ENCOUNTER — TELEPHONE (OUTPATIENT)
Dept: OBGYN CLINIC | Age: 47
End: 2022-04-19

## 2022-11-01 ENCOUNTER — HOSPITAL ENCOUNTER (OUTPATIENT)
Dept: MAMMOGRAPHY | Age: 47
Discharge: HOME | End: 2022-11-03
Payer: COMMERCIAL

## 2022-11-01 DIAGNOSIS — Z12.31 VISIT FOR SCREENING MAMMOGRAM: ICD-10-CM

## 2022-11-01 DIAGNOSIS — Z01.419 ENCOUNTER FOR GYNECOLOGICAL EXAMINATION WITHOUT ABNORMAL FINDING: ICD-10-CM

## 2022-11-01 PROCEDURE — 77067 SCR MAMMO BI INCL CAD: CPT

## 2022-12-08 ENCOUNTER — OFFICE VISIT (OUTPATIENT)
Dept: OBGYN CLINIC | Age: 47
End: 2022-12-08
Payer: COMMERCIAL

## 2022-12-08 VITALS
WEIGHT: 143 LBS | HEIGHT: 65 IN | SYSTOLIC BLOOD PRESSURE: 130 MMHG | BODY MASS INDEX: 23.82 KG/M2 | DIASTOLIC BLOOD PRESSURE: 80 MMHG

## 2022-12-08 DIAGNOSIS — A63.0 GENITAL CONDYLOMA, FEMALE: Primary | ICD-10-CM

## 2022-12-08 PROCEDURE — 99213 OFFICE O/P EST LOW 20 MIN: CPT | Performed by: OBSTETRICS & GYNECOLOGY

## 2022-12-08 PROCEDURE — G8420 CALC BMI NORM PARAMETERS: HCPCS | Performed by: OBSTETRICS & GYNECOLOGY

## 2022-12-08 PROCEDURE — G8427 DOCREV CUR MEDS BY ELIG CLIN: HCPCS | Performed by: OBSTETRICS & GYNECOLOGY

## 2022-12-08 PROCEDURE — 3078F DIAST BP <80 MM HG: CPT | Performed by: OBSTETRICS & GYNECOLOGY

## 2022-12-08 PROCEDURE — G8484 FLU IMMUNIZE NO ADMIN: HCPCS | Performed by: OBSTETRICS & GYNECOLOGY

## 2022-12-08 PROCEDURE — 1036F TOBACCO NON-USER: CPT | Performed by: OBSTETRICS & GYNECOLOGY

## 2022-12-08 PROCEDURE — 3074F SYST BP LT 130 MM HG: CPT | Performed by: OBSTETRICS & GYNECOLOGY

## 2022-12-08 RX ORDER — PODOFILOX 5 MG/G
GEL TOPICAL
Qty: 3.5 G | Refills: 3 | Status: SHIPPED | OUTPATIENT
Start: 2022-12-08 | End: 2022-12-18

## 2022-12-08 NOTE — PATIENT INSTRUCTIONS
A prescription will be sent into your pharmacy today. Please fill it and use as directed. Return to the office in 4 weeks. Happy holidays!

## 2022-12-08 NOTE — PROGRESS NOTES
Physical Exam  Genitourinary:      Genitourinary Comments: [image]     Kp Blackmon presents today with a history of a nontender growth around her genitals. This is been present for the last 1 to 2 months. She denies any abnormal discharge, vaginal irritation although she does have some occasional itching. She has not been sexually active since April. Physical exam:      External genitalia reveals multiple condyloma without any perianal involvement. noted. The vagina with appearing discharge and no vaginal or cervical lesions or inflammation noted. Assessment/Plan:  Genital condyloma  She was prescribed Condylox. She will return to the office in 4 weeks or prn. Yumiko Caputo MD, [address] [facility] [address].   [address] [place] [address],[address]

## 2022-12-15 DIAGNOSIS — A63.0 GENITAL CONDYLOMA, FEMALE: ICD-10-CM

## 2022-12-15 DIAGNOSIS — A63.0 GENITAL CONDYLOMA, FEMALE: Primary | ICD-10-CM

## 2022-12-19 RX ORDER — PODOFILOX 5 MG/ML
SOLUTION TOPICAL
Qty: 1 EACH | Refills: 2 | OUTPATIENT
Start: 2022-12-19 | End: 2022-12-25

## 2023-02-02 RX ORDER — PODOFILOX 5 MG/G
GEL TOPICAL
Status: CANCELLED | OUTPATIENT
Start: 2023-02-02 | End: 2023-02-12

## 2023-02-02 NOTE — TELEPHONE ENCOUNTER
Patient states she never got a chance to  this medication from the pharmacy and it was put back on the shelf and would like to try the medication.

## 2023-02-14 ENCOUNTER — TELEPHONE (OUTPATIENT)
Dept: OBGYN CLINIC | Age: 48
End: 2023-02-14

## 2023-02-14 NOTE — TELEPHONE ENCOUNTER
Pt called the pharmacy never received her script she is wondering if she can have it resent please advise podofilox (CONDYLOX) 0.5 % gel

## 2023-02-23 RX ORDER — PODOFILOX 5 MG/ML
SOLUTION TOPICAL
Qty: 1 EACH | Refills: 4 | Status: SHIPPED | OUTPATIENT
Start: 2023-02-23 | End: 2023-03-05

## 2024-03-15 RX ORDER — PODOFILOX 5 MG/ML
SOLUTION TOPICAL
Qty: 3.5 ML | Refills: 0 | Status: SHIPPED | OUTPATIENT
Start: 2024-03-15

## 2025-06-18 NOTE — PROGRESS NOTES
Reported on 2025)  0    ketoconazole (NIZORAL) 2 % shampoo USE AS A WASH ON FACE ONCE DAILY (Patient not taking: Reported on 2025)  0    pantoprazole (PROTONIX) 40 MG tablet Take 1 tablet by mouth daily (Patient not taking: Reported on 2025) 30 tablet 2    cetirizine (ZYRTEC) 10 MG tablet Take 1 tablet by mouth daily (Patient not taking: Reported on 2022) 30 tablet 2     No current facility-administered medications for this visit.       No data recorded     **If either question is answered in a  positive fashion then complete the PHQ9 Scoring Evaluation and make the appropriate referral**        Allergies:  Iodine and Pcn [penicillins]    Gynecologic History:  Patient's last menstrual period was 2022.  Sexually Active:No  Dyspareunia: n/a  STD History: Yes hpv  Colonoscopy: is ordered  Fam Hx Breast, Ovarian, Colorectal Ca: ovarian- mggm    OB History    Para Term  AB Living   2 2 0 0 0 0   SAB IAB Ectopic Molar Multiple Live Births   0 0 0 0 0 0       Review of Systems   Constitutional: Negative.  Negative for activity change, appetite change and fatigue.   HENT: Negative.     Respiratory: Negative.  Negative for cough and shortness of breath.    Cardiovascular: Negative.    Gastrointestinal: Negative.  Negative for abdominal distention.   Endocrine: Negative.    Genitourinary: Negative.    Musculoskeletal:  Negative for arthralgias and back pain.   Skin: Negative.    Allergic/Immunologic: Negative.    Neurological:  Negative for dizziness and light-headedness.         Chaperone for Intimate Exam  Chaperone was offered as part of the rooming process. Patient declined and agrees to continue with exam without a chaperone.         Physical Exam  Vitals reviewed.   Constitutional:       Appearance: Normal appearance.   HENT:      Head: Normocephalic.   Cardiovascular:      Rate and Rhythm: Normal rate and regular rhythm.   Pulmonary:      Effort: Pulmonary effort is normal.

## 2025-07-01 ENCOUNTER — HOSPITAL ENCOUNTER (OUTPATIENT)
Age: 50
Setting detail: SPECIMEN
Discharge: HOME | End: 2025-07-01

## 2025-07-01 ENCOUNTER — OFFICE VISIT (OUTPATIENT)
Dept: OBGYN CLINIC | Age: 50
End: 2025-07-01
Payer: COMMERCIAL

## 2025-07-01 VITALS — HEIGHT: 64 IN | BODY MASS INDEX: 24.92 KG/M2 | WEIGHT: 146 LBS | HEART RATE: 78 BPM

## 2025-07-01 DIAGNOSIS — Z01.419 ENCOUNTER FOR GYNECOLOGICAL EXAMINATION WITHOUT ABNORMAL FINDING: Primary | ICD-10-CM

## 2025-07-01 DIAGNOSIS — A63.0 CONDYLOMA ACUMINATA: ICD-10-CM

## 2025-07-01 PROCEDURE — 99396 PREV VISIT EST AGE 40-64: CPT | Performed by: NURSE PRACTITIONER

## 2025-07-01 RX ORDER — PODOFILOX 5 MG/ML
SOLUTION TOPICAL
Qty: 3.5 ML | Refills: 1 | Status: SHIPPED | OUTPATIENT
Start: 2025-07-01

## 2025-07-01 ASSESSMENT — ENCOUNTER SYMPTOMS
COUGH: 0
ABDOMINAL DISTENTION: 0
ALLERGIC/IMMUNOLOGIC NEGATIVE: 1
GASTROINTESTINAL NEGATIVE: 1
SHORTNESS OF BREATH: 0
RESPIRATORY NEGATIVE: 1
BACK PAIN: 0

## 2025-07-03 ENCOUNTER — RESULTS FOLLOW-UP (OUTPATIENT)
Dept: OBGYN CLINIC | Age: 50
End: 2025-07-03

## 2025-07-03 LAB
HPV I/H RISK 4 DNA CVX QL NAA+PROBE: NOT DETECTED
HPV SAMPLE: NORMAL
HPV, INTERPRETATION: NORMAL
HPV16 DNA CVX QL NAA+PROBE: NOT DETECTED
HPV18 DNA CVX QL NAA+PROBE: NOT DETECTED
SPECIMEN DESCRIPTION: NORMAL

## 2025-07-06 LAB — CYTOLOGY REPORT: NORMAL
